# Patient Record
(demographics unavailable — no encounter records)

---

## 2024-12-12 NOTE — DISCUSSION/SUMMARY
[EKG obtained to assist in diagnosis and management of assessed problem(s)] : EKG obtained to assist in diagnosis and management of assessed problem(s)
(3) no apparent problem

## 2024-12-12 NOTE — PHYSICAL EXAM
[General Appearance - Well Developed] : well developed [Normal Appearance] : normal appearance [Well Groomed] : well groomed [General Appearance - Well Nourished] : well nourished [No Deformities] : no deformities [General Appearance - In No Acute Distress] : no acute distress [Normal Conjunctiva] : the conjunctiva exhibited no abnormalities [Eyelids - No Xanthelasma] : the eyelids demonstrated no xanthelasmas [Normal Oral Mucosa] : normal oral mucosa [No Oral Pallor] : no oral pallor [No Oral Cyanosis] : no oral cyanosis [Normal Jugular Venous A Waves Present] : normal jugular venous A waves present [Normal Jugular Venous V Waves Present] : normal jugular venous V waves present [No Jugular Venous Guillory A Waves] : no jugular venous guillory A waves [Heart Rate And Rhythm] : heart rate and rhythm were normal [Heart Sounds] : normal S1 and S2 [Murmurs] : no murmurs present [Respiration, Rhythm And Depth] : normal respiratory rhythm and effort [Exaggerated Use Of Accessory Muscles For Inspiration] : no accessory muscle use [Auscultation Breath Sounds / Voice Sounds] : lungs were clear to auscultation bilaterally [Bowel Sounds] : normal bowel sounds [Abdomen Soft] : soft [Abdomen Tenderness] : non-tender [Abdomen Mass (___ Cm)] : no abdominal mass palpated [Abnormal Walk] : normal gait [Gait - Sufficient For Exercise Testing] : the gait was sufficient for exercise testing [Nail Clubbing] : no clubbing of the fingernails [Cyanosis, Localized] : no localized cyanosis [Petechial Hemorrhages (___cm)] : no petechial hemorrhages [Skin Color & Pigmentation] : normal skin color and pigmentation [] : no rash [No Venous Stasis] : no venous stasis [Skin Lesions] : no skin lesions [No Skin Ulcers] : no skin ulcer [No Xanthoma] : no  xanthoma was observed [Oriented To Time, Place, And Person] : oriented to person, place, and time

## 2025-02-05 NOTE — REASON FOR VISIT
[Other: ____] : [unfilled] [Spouse] : spouse [Patient Declined  Services] : - None: Patient declined  services [FreeTextEntry3] : Dr. Warren [Interpreters_Relationshiptopatient] : spouse [TWNoteComboBox1] : Samoan

## 2025-02-05 NOTE — CARDIOLOGY SUMMARY
[de-identified] : 2/5/2025: SB (HR 56 bpm) 8/7/2024: NSR (HR 63 bpm) 2/28/2024: NSR (HR 65 bpm) 9/6/2023: Sinus Darius (HR 53 bpm) 6/21/2023: Sinus Darius (HR 51 bpm) 5/17/2023 AFib with cont VR (HR 70 bpm) 3/26/2023 AFib with cont VR (HR 68 bpm) 9/28/2022 AFib with cont VR (HR 78 bpm) 8/24/2022 AFib with cont VR (HR 81 bpm) [de-identified] : 9/28-10/11/2022 100% AF with cont VR (avg HR 73 bpm). Min 50 bpm, max 120 bpm. [de-identified] : Nuclear Stress 6/2024 EF > 55%. Small sized perfusion defect involving apical lateral wall of the LV. On resting images, this defect is reversible, suggesting ischemia but attenuation artifact cannot be excluded. TID 0.92.   Nuclear Stress 2017 EF > 55% No fixed, no reperfusion defects [de-identified] : TTE 6/6/2024: EF 69% Mild MR. Mild TR. Dilated ascending aorta (3.8 cm). Severe LAE (5.13 cm)  CIRO 5/30/23 EF 50-55% Mild MR. Mild TR. LAE. Successful cardioversion to NSR with 200J 9/14/2022 EF 55-60% Mod LAE. Intact intraatrial septum with agitated saline. Mild MR. Mild TR. No AI.  8/23/2022 EF 53% Grade 1 DD. Mild MR. Mild TR. Severe LAE

## 2025-02-05 NOTE — HISTORY OF PRESENT ILLNESS
[FreeTextEntry1] : Cardiologist: Dr. Warren PCP: Dr. Jolly Pulm: Dr. Little  81 yo [place] speaking M with history of HTN, MR, AI, thyroid disease, AFib on Eliquis here for evaluation of AFib, diagnosed Aug 2021 (never cardioverted or ablated). Patient started on OAC, but self-discontinued it after a few months because he felt fine. On 8/16/2022, pt started to feel chest tightness/congestion and dyspnea. Saw his PCP and was told he was retaining water. Took Lasix for one week and started Aspirin 325 mg daily.   Patient had successful CIRO/CV 9/14/22 but felt no difference when he was in sinus rhythm or in AF. He didn't realize he was back in AF until he saw Dr. Warren 9/22/22. Of note, pt has been having a lot of stress around his son being diagnosed with melanoma.   3/29/23 C/o fatigue for a long time. Wife feels it might be due to inactivity. They want to join a gym and see if it helps.    5/17/23 C/o feeling tired. Slipped when he went to sit on a bench when it toppled over and hurt his knees, so he has not been able to start going to the gym. Otherwise, he feels well.   6/21/23 Second CIRO/CV done on May 30, 2023. The patient is in NSR today. Feels well and states he feels like he can walk further.  9/6/23 Feels well today. Going to [facility] soon. Thinks he will need knee surgery once he gets back.   2/28/24 Here for routine follow up. Feels well. Denies chest pain, palpitations, dizziness, lightheadedness, presyncope or syncope. Going to [place] soon.  8/7/24 Here for routine follow up. Feels well.   2/5/2025 Here for routine follow up visit. Had a cold a few weeks ago. Denies chest pain, palpitations, dizziness, lightheadedness, presyncope or syncope.

## 2025-02-05 NOTE — ASSESSMENT
[FreeTextEntry1] : # Paroxysmal AFib - s/p successful cardioversion. Currently in sinus rhythm on Multaq. Feels well. - Discussed various options for AF management including AAD vs ablation. Patient would like to cont AAD at this time. Cont Multaq. Recent labs from Dec with normal Cr and LFTs.   - Cont Eliquis 5 mg PO BID for CHADS VASc at least 3 (HTN, Age > 75). Denies s/sx of bleeding.  - Cont Metoprolol Succinate ER to 25 mg daily.   # PATRICK - C/w CPAP. Has portable one to travel.   # HTN - BP well controlled. - Cont Metoprolol ER 25 mg daily. - 2g Na diet enforced.  I have also advised the patient to go to the nearest emergency room if he experiences any chest pain, dyspnea, syncope, or has any other compelling symptoms.   Follow up in 6 months.

## 2025-02-05 NOTE — DISCUSSION/SUMMARY
[FreeTextEntry1] : We had an extensive conversation regarding the nature of atrial fibrillation, including potential etiologies, underlying pathophysiology and natural history of the disease. In addition, the potential risk of thromboembolic events and assessment of that risk were discussed. I have emphasized the importance of continuing anticoagulation.   In addition, the maintenance of sinus rhythm along with adjuvant antiarrhythmic agents and catheter ablation therapy were discussed. The rationale for and risks of ablation therapy were discussed, including but not limited to bleeding, vascular injury, groin complications, cardiac perforation and tamponade, stroke, esophageal injury, pulmonary vein stenosis, need for pacemaker, need for cardiac surgery, and death. In addition, the long-term and ongoing nature of this therapy were also discussed, including the critical role of continued monitoring post-ablation and the potential for the necessity of repeat ablation procedures to definitively treat the condition.   The patient verbalized understanding of the discussion and all questions were addressed and answered. The patient would like to AVOID ablation for now. [EKG obtained to assist in diagnosis and management of assessed problem(s)] : EKG obtained to assist in diagnosis and management of assessed problem(s)
